# Patient Record
Sex: MALE | Race: OTHER | HISPANIC OR LATINO | URBAN - METROPOLITAN AREA
[De-identification: names, ages, dates, MRNs, and addresses within clinical notes are randomized per-mention and may not be internally consistent; named-entity substitution may affect disease eponyms.]

---

## 2018-02-19 ENCOUNTER — EMERGENCY (EMERGENCY)
Facility: HOSPITAL | Age: 48
LOS: 1 days | Discharge: HOME | End: 2018-02-19
Attending: EMERGENCY MEDICINE
Payer: MEDICAID

## 2018-02-19 VITALS
DIASTOLIC BLOOD PRESSURE: 87 MMHG | RESPIRATION RATE: 16 BRPM | SYSTOLIC BLOOD PRESSURE: 131 MMHG | OXYGEN SATURATION: 100 % | HEART RATE: 81 BPM

## 2018-02-19 VITALS
DIASTOLIC BLOOD PRESSURE: 89 MMHG | RESPIRATION RATE: 20 BRPM | TEMPERATURE: 99 F | OXYGEN SATURATION: 99 % | WEIGHT: 205.03 LBS | HEIGHT: 70 IN | HEART RATE: 80 BPM | SYSTOLIC BLOOD PRESSURE: 133 MMHG

## 2018-02-19 PROCEDURE — 71046 X-RAY EXAM CHEST 2 VIEWS: CPT | Mod: 26

## 2018-02-19 PROCEDURE — 99283 EMERGENCY DEPT VISIT LOW MDM: CPT | Mod: 25

## 2018-02-19 PROCEDURE — 71046 X-RAY EXAM CHEST 2 VIEWS: CPT

## 2018-02-19 PROCEDURE — 93005 ELECTROCARDIOGRAM TRACING: CPT

## 2018-02-19 PROCEDURE — 96372 THER/PROPH/DIAG INJ SC/IM: CPT

## 2018-02-19 PROCEDURE — 99284 EMERGENCY DEPT VISIT MOD MDM: CPT | Mod: 25

## 2018-02-19 PROCEDURE — 93010 ELECTROCARDIOGRAM REPORT: CPT

## 2018-02-19 RX ORDER — KETOROLAC TROMETHAMINE 30 MG/ML
15 SYRINGE (ML) INJECTION ONCE
Qty: 0 | Refills: 0 | Status: DISCONTINUED | OUTPATIENT
Start: 2018-02-19 | End: 2018-02-19

## 2018-02-19 RX ADMIN — Medication 15 MILLIGRAM(S): at 11:58

## 2018-02-19 NOTE — ED PROVIDER NOTE - CARE PLAN
Principal Discharge DX:	Cough Principal Discharge DX:	Cough  Assessment and plan of treatment:	You have been diagnosed with a viral cough. The medicine that we gave you here was like motrin, you can continue to take over the counter motrin and tylenol to help with your symptoms. Make sure that you continue to eat and drink plenty of fluids. If you have any new or concerning symptoms please come right back to the emergency room.

## 2018-02-19 NOTE — ED PROVIDER NOTE - PLAN OF CARE
You have been diagnosed with a viral cough. The medicine that we gave you here was like motrin, you can continue to take over the counter motrin and tylenol to help with your symptoms. Make sure that you continue to eat and drink plenty of fluids. If you have any new or concerning symptoms please come right back to the emergency room.

## 2018-02-19 NOTE — ED ADULT NURSE NOTE - OBJECTIVE STATEMENT
pt ambulatory to room 21 c/o cough body aches for 3 days. pt denies fever but c/o chills. denies sick contact.pt awake alert color good skin warm dry lungs clear with b/l breath sounds pulse ox100 c/o productive cough of green mucus.

## 2018-02-19 NOTE — ED PROVIDER NOTE - MEDICAL DECISION MAKING DETAILS
47 y M with no pmhx with cough and body aches. DDx: Flu, viral URI, bronchitis. Will get EKG for screening, IM toradol for body aches, CXR. If neg will likely DC home with PO hydration. 47 y M with no pmhx with cough and body aches. DDx: Flu, viral URI, bronchitis. Will get EKG for screening, IM toradol for body aches, CXR. If neg will likely DC home with PO hydration.  maged - healthy male with 3 days cough , uri , symptoms, malisise body aches likles flu cp w cough - cxr, ekg, if neg - dc w supportive care

## 2018-02-19 NOTE — ED PROVIDER NOTE - OBJECTIVE STATEMENT
pt 47 Y M with no pmhx non-smoker who presents with 3 days of mildly productive. He admits to body aches, sputum that is mildly green. Denies nausea, vomiting, diarrhea, fever, LOC. He states that he has some anterior chest pain only when coughing. He has no known cardiac hx and denies diaphoresis.

## 2019-03-10 PROBLEM — Z83.438 FAMILY HISTORY OF HYPERLIPIDEMIA: Status: ACTIVE | Noted: 2019-03-10

## 2019-03-10 PROBLEM — Z78.9 DOES NOT USE ILLICIT DRUGS: Status: ACTIVE | Noted: 2019-03-10

## 2019-03-10 PROBLEM — Z78.9 DOES NOT USE TOBACCO: Status: ACTIVE | Noted: 2019-03-10

## 2019-03-10 PROBLEM — Z86.39 HISTORY OF HYPERLIPIDEMIA: Status: RESOLVED | Noted: 2019-03-10 | Resolved: 2019-03-10

## 2019-03-11 ENCOUNTER — NON-APPOINTMENT (OUTPATIENT)
Age: 49
End: 2019-03-11

## 2019-03-11 ENCOUNTER — APPOINTMENT (OUTPATIENT)
Dept: CARDIOLOGY | Facility: CLINIC | Age: 49
End: 2019-03-11
Payer: MEDICAID

## 2019-03-11 VITALS
SYSTOLIC BLOOD PRESSURE: 142 MMHG | HEIGHT: 68 IN | WEIGHT: 200 LBS | OXYGEN SATURATION: 97 % | HEART RATE: 59 BPM | BODY MASS INDEX: 30.31 KG/M2 | DIASTOLIC BLOOD PRESSURE: 91 MMHG

## 2019-03-11 DIAGNOSIS — Z86.39 PERSONAL HISTORY OF OTHER ENDOCRINE, NUTRITIONAL AND METABOLIC DISEASE: ICD-10-CM

## 2019-03-11 DIAGNOSIS — Z83.438 FAMILY HISTORY OF OTHER DISORDER OF LIPOPROTEIN METABOLISM AND OTHER LIPIDEMIA: ICD-10-CM

## 2019-03-11 DIAGNOSIS — Z78.9 OTHER SPECIFIED HEALTH STATUS: ICD-10-CM

## 2019-03-11 DIAGNOSIS — R07.9 CHEST PAIN, UNSPECIFIED: ICD-10-CM

## 2019-03-11 PROCEDURE — 99205 OFFICE O/P NEW HI 60 MIN: CPT | Mod: 25

## 2019-03-11 PROCEDURE — 99402 PREV MED CNSL INDIV APPRX 30: CPT

## 2019-03-11 PROCEDURE — 93000 ELECTROCARDIOGRAM COMPLETE: CPT

## 2019-03-12 ENCOUNTER — APPOINTMENT (OUTPATIENT)
Dept: CARDIOLOGY | Facility: CLINIC | Age: 49
End: 2019-03-12
Payer: MEDICAID

## 2019-03-12 PROCEDURE — 93015 CV STRESS TEST SUPVJ I&R: CPT

## 2019-04-25 NOTE — ED ADULT NURSE REASSESSMENT NOTE - TEMPLATE LIST FOR HEAD TO TOE ASSESSMENT
Quality 226: Preventive Care And Screening: Tobacco Use: Screening And Cessation Intervention: Patient screened for tobacco use and is an ex/non-smoker Respiratory Detail Level: Detailed Quality 431: Preventive Care And Screening: Unhealthy Alcohol Use - Screening: Patient screened for unhealthy alcohol use using a single question and scores less than 2 times per year Quality 130: Documentation Of Current Medications In The Medical Record: Current Medications Documented

## 2023-11-09 ENCOUNTER — EMERGENCY (EMERGENCY)
Facility: HOSPITAL | Age: 53
LOS: 1 days | Discharge: HOME | End: 2023-11-09
Attending: EMERGENCY MEDICINE
Payer: MEDICAID

## 2023-11-09 VITALS
HEIGHT: 68 IN | SYSTOLIC BLOOD PRESSURE: 146 MMHG | RESPIRATION RATE: 16 BRPM | HEART RATE: 72 BPM | OXYGEN SATURATION: 97 % | DIASTOLIC BLOOD PRESSURE: 97 MMHG | TEMPERATURE: 98 F | WEIGHT: 214.95 LBS

## 2023-11-09 VITALS — RESPIRATION RATE: 15 BRPM | TEMPERATURE: 98 F | HEART RATE: 70 BPM | OXYGEN SATURATION: 95 %

## 2023-11-09 LAB
ALBUMIN SERPL ELPH-MCNC: 4.5 G/DL — SIGNIFICANT CHANGE UP (ref 3.3–5)
ALBUMIN SERPL ELPH-MCNC: 4.5 G/DL — SIGNIFICANT CHANGE UP (ref 3.3–5)
ALP SERPL-CCNC: 55 U/L — SIGNIFICANT CHANGE UP (ref 40–120)
ALP SERPL-CCNC: 55 U/L — SIGNIFICANT CHANGE UP (ref 40–120)
ALT FLD-CCNC: 25 U/L — SIGNIFICANT CHANGE UP (ref 10–45)
ALT FLD-CCNC: 25 U/L — SIGNIFICANT CHANGE UP (ref 10–45)
ANION GAP SERPL CALC-SCNC: 13 MMOL/L — SIGNIFICANT CHANGE UP (ref 5–17)
ANION GAP SERPL CALC-SCNC: 13 MMOL/L — SIGNIFICANT CHANGE UP (ref 5–17)
APTT BLD: 29.1 SEC — SIGNIFICANT CHANGE UP (ref 24.5–35.6)
APTT BLD: 29.1 SEC — SIGNIFICANT CHANGE UP (ref 24.5–35.6)
AST SERPL-CCNC: 23 U/L — SIGNIFICANT CHANGE UP (ref 10–40)
AST SERPL-CCNC: 23 U/L — SIGNIFICANT CHANGE UP (ref 10–40)
BASOPHILS # BLD AUTO: 0.08 K/UL — SIGNIFICANT CHANGE UP (ref 0–0.2)
BASOPHILS # BLD AUTO: 0.08 K/UL — SIGNIFICANT CHANGE UP (ref 0–0.2)
BASOPHILS NFR BLD AUTO: 1 % — SIGNIFICANT CHANGE UP (ref 0–2)
BASOPHILS NFR BLD AUTO: 1 % — SIGNIFICANT CHANGE UP (ref 0–2)
BILIRUB SERPL-MCNC: 0.4 MG/DL — SIGNIFICANT CHANGE UP (ref 0.2–1.2)
BILIRUB SERPL-MCNC: 0.4 MG/DL — SIGNIFICANT CHANGE UP (ref 0.2–1.2)
BUN SERPL-MCNC: 14 MG/DL — SIGNIFICANT CHANGE UP (ref 7–23)
BUN SERPL-MCNC: 14 MG/DL — SIGNIFICANT CHANGE UP (ref 7–23)
CALCIUM SERPL-MCNC: 9.8 MG/DL — SIGNIFICANT CHANGE UP (ref 8.4–10.5)
CALCIUM SERPL-MCNC: 9.8 MG/DL — SIGNIFICANT CHANGE UP (ref 8.4–10.5)
CHLORIDE SERPL-SCNC: 103 MMOL/L — SIGNIFICANT CHANGE UP (ref 96–108)
CHLORIDE SERPL-SCNC: 103 MMOL/L — SIGNIFICANT CHANGE UP (ref 96–108)
CO2 SERPL-SCNC: 24 MMOL/L — SIGNIFICANT CHANGE UP (ref 22–31)
CO2 SERPL-SCNC: 24 MMOL/L — SIGNIFICANT CHANGE UP (ref 22–31)
CREAT SERPL-MCNC: 1.26 MG/DL — SIGNIFICANT CHANGE UP (ref 0.5–1.3)
CREAT SERPL-MCNC: 1.26 MG/DL — SIGNIFICANT CHANGE UP (ref 0.5–1.3)
EGFR: 69 ML/MIN/1.73M2 — SIGNIFICANT CHANGE UP
EGFR: 69 ML/MIN/1.73M2 — SIGNIFICANT CHANGE UP
EOSINOPHIL # BLD AUTO: 0.19 K/UL — SIGNIFICANT CHANGE UP (ref 0–0.5)
EOSINOPHIL # BLD AUTO: 0.19 K/UL — SIGNIFICANT CHANGE UP (ref 0–0.5)
EOSINOPHIL NFR BLD AUTO: 2.5 % — SIGNIFICANT CHANGE UP (ref 0–6)
EOSINOPHIL NFR BLD AUTO: 2.5 % — SIGNIFICANT CHANGE UP (ref 0–6)
GLUCOSE SERPL-MCNC: 105 MG/DL — HIGH (ref 70–99)
GLUCOSE SERPL-MCNC: 105 MG/DL — HIGH (ref 70–99)
HCT VFR BLD CALC: 45.8 % — SIGNIFICANT CHANGE UP (ref 39–50)
HCT VFR BLD CALC: 45.8 % — SIGNIFICANT CHANGE UP (ref 39–50)
HGB BLD-MCNC: 15.5 G/DL — SIGNIFICANT CHANGE UP (ref 13–17)
HGB BLD-MCNC: 15.5 G/DL — SIGNIFICANT CHANGE UP (ref 13–17)
IMM GRANULOCYTES NFR BLD AUTO: 0.5 % — SIGNIFICANT CHANGE UP (ref 0–0.9)
IMM GRANULOCYTES NFR BLD AUTO: 0.5 % — SIGNIFICANT CHANGE UP (ref 0–0.9)
INR BLD: 1.05 RATIO — SIGNIFICANT CHANGE UP (ref 0.85–1.18)
INR BLD: 1.05 RATIO — SIGNIFICANT CHANGE UP (ref 0.85–1.18)
LIDOCAIN IGE QN: 27 U/L — SIGNIFICANT CHANGE UP (ref 7–60)
LIDOCAIN IGE QN: 27 U/L — SIGNIFICANT CHANGE UP (ref 7–60)
LYMPHOCYTES # BLD AUTO: 1.74 K/UL — SIGNIFICANT CHANGE UP (ref 1–3.3)
LYMPHOCYTES # BLD AUTO: 1.74 K/UL — SIGNIFICANT CHANGE UP (ref 1–3.3)
LYMPHOCYTES # BLD AUTO: 22.8 % — SIGNIFICANT CHANGE UP (ref 13–44)
LYMPHOCYTES # BLD AUTO: 22.8 % — SIGNIFICANT CHANGE UP (ref 13–44)
MCHC RBC-ENTMCNC: 30.6 PG — SIGNIFICANT CHANGE UP (ref 27–34)
MCHC RBC-ENTMCNC: 30.6 PG — SIGNIFICANT CHANGE UP (ref 27–34)
MCHC RBC-ENTMCNC: 33.8 GM/DL — SIGNIFICANT CHANGE UP (ref 32–36)
MCHC RBC-ENTMCNC: 33.8 GM/DL — SIGNIFICANT CHANGE UP (ref 32–36)
MCV RBC AUTO: 90.3 FL — SIGNIFICANT CHANGE UP (ref 80–100)
MCV RBC AUTO: 90.3 FL — SIGNIFICANT CHANGE UP (ref 80–100)
MONOCYTES # BLD AUTO: 0.42 K/UL — SIGNIFICANT CHANGE UP (ref 0–0.9)
MONOCYTES # BLD AUTO: 0.42 K/UL — SIGNIFICANT CHANGE UP (ref 0–0.9)
MONOCYTES NFR BLD AUTO: 5.5 % — SIGNIFICANT CHANGE UP (ref 2–14)
MONOCYTES NFR BLD AUTO: 5.5 % — SIGNIFICANT CHANGE UP (ref 2–14)
NEUTROPHILS # BLD AUTO: 5.17 K/UL — SIGNIFICANT CHANGE UP (ref 1.8–7.4)
NEUTROPHILS # BLD AUTO: 5.17 K/UL — SIGNIFICANT CHANGE UP (ref 1.8–7.4)
NEUTROPHILS NFR BLD AUTO: 67.7 % — SIGNIFICANT CHANGE UP (ref 43–77)
NEUTROPHILS NFR BLD AUTO: 67.7 % — SIGNIFICANT CHANGE UP (ref 43–77)
NRBC # BLD: 0 /100 WBCS — SIGNIFICANT CHANGE UP (ref 0–0)
NRBC # BLD: 0 /100 WBCS — SIGNIFICANT CHANGE UP (ref 0–0)
PLATELET # BLD AUTO: 274 K/UL — SIGNIFICANT CHANGE UP (ref 150–400)
PLATELET # BLD AUTO: 274 K/UL — SIGNIFICANT CHANGE UP (ref 150–400)
POTASSIUM SERPL-MCNC: 4 MMOL/L — SIGNIFICANT CHANGE UP (ref 3.5–5.3)
POTASSIUM SERPL-MCNC: 4 MMOL/L — SIGNIFICANT CHANGE UP (ref 3.5–5.3)
POTASSIUM SERPL-SCNC: 4 MMOL/L — SIGNIFICANT CHANGE UP (ref 3.5–5.3)
POTASSIUM SERPL-SCNC: 4 MMOL/L — SIGNIFICANT CHANGE UP (ref 3.5–5.3)
PROT SERPL-MCNC: 7.3 G/DL — SIGNIFICANT CHANGE UP (ref 6–8.3)
PROT SERPL-MCNC: 7.3 G/DL — SIGNIFICANT CHANGE UP (ref 6–8.3)
PROTHROM AB SERPL-ACNC: 11.5 SEC — SIGNIFICANT CHANGE UP (ref 9.5–13)
PROTHROM AB SERPL-ACNC: 11.5 SEC — SIGNIFICANT CHANGE UP (ref 9.5–13)
RBC # BLD: 5.07 M/UL — SIGNIFICANT CHANGE UP (ref 4.2–5.8)
RBC # BLD: 5.07 M/UL — SIGNIFICANT CHANGE UP (ref 4.2–5.8)
RBC # FLD: 13.2 % — SIGNIFICANT CHANGE UP (ref 10.3–14.5)
RBC # FLD: 13.2 % — SIGNIFICANT CHANGE UP (ref 10.3–14.5)
SODIUM SERPL-SCNC: 140 MMOL/L — SIGNIFICANT CHANGE UP (ref 135–145)
SODIUM SERPL-SCNC: 140 MMOL/L — SIGNIFICANT CHANGE UP (ref 135–145)
WBC # BLD: 7.64 K/UL — SIGNIFICANT CHANGE UP (ref 3.8–10.5)
WBC # BLD: 7.64 K/UL — SIGNIFICANT CHANGE UP (ref 3.8–10.5)
WBC # FLD AUTO: 7.64 K/UL — SIGNIFICANT CHANGE UP (ref 3.8–10.5)
WBC # FLD AUTO: 7.64 K/UL — SIGNIFICANT CHANGE UP (ref 3.8–10.5)

## 2023-11-09 PROCEDURE — 85730 THROMBOPLASTIN TIME PARTIAL: CPT

## 2023-11-09 PROCEDURE — 99285 EMERGENCY DEPT VISIT HI MDM: CPT

## 2023-11-09 PROCEDURE — 83690 ASSAY OF LIPASE: CPT

## 2023-11-09 PROCEDURE — 74177 CT ABD & PELVIS W/CONTRAST: CPT | Mod: MA

## 2023-11-09 PROCEDURE — 99284 EMERGENCY DEPT VISIT MOD MDM: CPT | Mod: 25

## 2023-11-09 PROCEDURE — 74177 CT ABD & PELVIS W/CONTRAST: CPT | Mod: 26,MA

## 2023-11-09 PROCEDURE — 85610 PROTHROMBIN TIME: CPT

## 2023-11-09 PROCEDURE — 80053 COMPREHEN METABOLIC PANEL: CPT

## 2023-11-09 PROCEDURE — 85025 COMPLETE CBC W/AUTO DIFF WBC: CPT

## 2023-11-09 NOTE — ED ADULT NURSE NOTE - OBJECTIVE STATEMENT
51yo M aaox4 h/o HTN, presents to ED from home, as per pt 15 days ago had an accidental lifting a heavy object , since 5 days ago started with bruising on the l lower abdominal area and pain, at this time is mild, Pt denies CP, SOB, HA, vision changes, n/v/d, fevers chills, weakness, URY symptoms Safety and comfort measures initiated- bed placed in lowest position and side rails raised.

## 2023-11-09 NOTE — ED PROVIDER NOTE - NSFOLLOWUPINSTRUCTIONS_ED_ALL_ED_FT
1) Follow-up with your primary care provider in 1-2 days.    2) Continue to take all medications as prescribed.    3) Rest and stay hydrated. Pain can be managed with Acetaminophen (aka Tylenol) over the counter as directed.    4) Return to the ER for any new or worsening symptoms, weakness, shortness of breath, palpitations, lightheadedness, weakness, new or worsening abdominal pain, blood in the urine or stool, fevers, chills, or if any other concerns.      Please read all attached patient information. 1) Follow-up with your primary care provider in 1-2 days.    Your CT today did not show any emergent findings, however it did show "1.0 cm right renal cortical exophytic dense lesion at the superior pole, too small to characterize." Radiology recommends nonemergent ultrasound for further characterization.    Please discuss these results with your PCP. A copy of your results is included with these discharge instructions.    2) Continue to take all medications as prescribed.    3) Rest and stay hydrated. Pain can be managed with Acetaminophen (aka Tylenol) over the counter as directed.    4) Return to the ER for any new or worsening symptoms, weakness, shortness of breath, palpitations, lightheadedness, weakness, new or worsening abdominal pain, blood in the urine or stool, fevers, chills, or if any other concerns.      Please read all attached patient information.

## 2023-11-09 NOTE — ED PROVIDER NOTE - PATIENT PORTAL LINK FT
You can access the FollowMyHealth Patient Portal offered by [facility] by registering at the following website: [email or web address]. By joining [employer]’s FollowMyHealth portal, you will also be able to view your health information using other applications (apps) compatible with our system.

## 2023-11-09 NOTE — ED ADULT NURSE NOTE - NSFALLUNIVINTERV_ED_ALL_ED
Detail Level: Detailed
Bed/Stretcher in lowest position, wheels locked, appropriate side rails in place/Call bell, personal items and telephone in reach/Instruct patient to call for assistance before getting out of bed/chair/stretcher/Non-slip footwear applied when patient is off stretcher/[place] to call system/Physically safe environment - no spills, clutter or unnecessary equipment/Purposeful proactive rounding/Room/bathroom lighting operational, light cord in reach

## 2023-11-09 NOTE — ED PROVIDER NOTE - CLINICAL SUMMARY MEDICAL DECISION MAKING FREE TEXT BOX
Is unclear the cause of the patient's abdominal wall discoloration.  Concern for RP hematoma.  Basic blood work and CT abdomen with IV contrast to evaluate for splenic injury RP bleed hernia or other cause of the patient's symptoms.  Patient does not want pain medication at this very moment. Is unclear the cause of the patient's abdominal wall discoloration.  Concern for RP hematoma.  Basic blood work and CT abdomen with IV contrast to evaluate for splenic injury RP bleed hernia or other cause of the patient's symptoms.  Patient does not want pain medication at this very moment. -Juan M Diggs MD-

## 2023-11-09 NOTE — ED PROVIDER NOTE - PROGRESS NOTE DETAILS
CT resulted no acute pathology, hemoglobin within normal limits, patient abdomen soft nontender, patient feeling well and vital signs stable in ED.  Patient ready and stable for discharge.  All results discussed with patient including incidental finding of right renal cortical lesion, recommended outpatient PCP follow-up for further imaging. -Aubrey Ayala PA-C

## 2023-11-09 NOTE — ED PROVIDER NOTE - OBJECTIVE STATEMENT
This is a 52-year-old fellow 2 weeks ago was lifting something hurt the left side of his back.  No saddle anesthesia no urinary incontinence.  He went to see urgent care and based prescribed him a muscle relaxant and naproxen and has developed a left-sided abdominal wall discoloration as well as when he moves a lot sometimes he gets severe intermittent pain in the left side.  No aspirin or blood thinners.  Never happened to him before.  He went to see his doctor, Dr. Jorge Luis Mcdaniels who sent him to the hospital.  The pain is not very severe but can occasionally does get severe.  That discoloration is the worrisome thing.  That is been going on for about a week. This is a 52-year-old fellow 2 weeks ago was lifting something hurt the left side of his back.  No saddle anesthesia no urinary incontinence.  He went to see urgent care and based prescribed him a muscle relaxant and naproxen and has developed a left-sided abdominal wall discoloration as well as when he moves a lot sometimes he gets severe intermittent pain in the left side.  No aspirin or blood thinners.  Never happened to him before.  He went to see his doctor, Dr. Jorge Luis Mcdaniels who sent him to the hospital.  The pain is not very severe but can occasionally does get severe.  That discoloration is the worrisome thing.  That is been going on for about a week.    52-year-old male PMH of HLD presents to the ED complaining of back and side pain after lifting a heavy object 15 days ago.  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  Several days ago patient reported development of ecchymosis in his left lower quadrant, has mild tenderness to this area, was seen by his PCP today who referred him to the emergency department.  Patient denies history of pancreatitis or other pancreatic disease, alcohol use, fevers, chills, other current abdominal pain, nausea, vomiting, diarrhea, change in bladder or bowel habits, urinary complaints, testicular pain, anticoagulant or antiplatelet use.  Patient reports feeling mild lightheadedness, denies shortness of breath, palpitations, weakness, fatigue.

## 2023-11-09 NOTE — ED ADULT NURSE NOTE - ED STAT RN HANDOFF DETAILS
Report given to receiving change of shift EVELYN CASTILLO patient is in no acute distress. Patient vital signs stable, plan of care explained.

## 2023-11-09 NOTE — ED PROVIDER NOTE - PHYSICAL EXAMINATION
Awake alert talking no acute distress.  Ambulation with normal gait.  Fluid speech.  Left abdominal wall with ecchymosis.  No midline spinal tenderness.  No paraspinal tenderness.  No CVA tenderness.  No obvious hernias.  Regular rate and rhythm.  Clear lungs. Awake alert talking no acute distress.  Ambulation with normal gait.  Fluid speech.  Left abdominal wall with ecchymosis.  No midline spinal tenderness.  No paraspinal tenderness.  No CVA tenderness.  No obvious hernias.  Regular rate and rhythm.  Clear lungs.    GEN: Pt in NAD, A&O x3.  PSYCH: Affect appropriate.  EYES: Sclera white w/o injection.   ENT: Head NCAT. MMM. Neck supple FROM.  RESP: CTA b/l, no wheezes, rales, or rhonchi.   CARDIAC: RRR, clear distinct S1, S2, no appreciable murmurs.  ABD: Abdomen soft, mild LLQ ttp. No CVAT b/l.  VASC: 2+ radial and dorsalis pedis pulses b/l. No edema or calf tenderness.  MSK: Mild pain with resisted left lateral rotation of trunk.  SKIN: Ecchymosis LLQ extending toward L flank.

## 2023-11-09 NOTE — ED ADULT TRIAGE NOTE - CHIEF COMPLAINT QUOTE
lower back pain x 2 weeks. L side of abd hematoma. pt reports lifting something 500lb prior to pain and bruising.

## 2024-02-23 NOTE — ED PROVIDER NOTE - NS ED NOTE AC HIGH RISK COUNTRIES
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  Consulted Interventional Radiology for a biopsy on 02/23/2024 to determine a definitive diagnosis and possible treatment options    CTA of the chest/PE protocol/CT scan of the abdomen and pelvis (02/21/2024):  IMPRESSION:     No evidence for acute pulmonary embolus. No acute thoracic abnormality identified.     Increasing elevation of the diaphragm secondary to large abdominal tumors process.     There is extensive multi nodular solid enhancing mass lesions as well as large hypertrophic fatty components. This extends throughout the retroperitoneum bilaterally, with significant mass effect on the liver, kidneys and associated vasculature. There is also significant compression and distortion of the stomach and duodenal sweep related to large masses along the margin of the stomach as well as appearing intraluminal within the duodenum. Multiple solid enhancing masses within the retroperitoneum which are increased compared to the prior. 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     Mild right sided hydronephrosis likely related to distortion and mass effect described above.     Several midline abdominal wall ventral hernias containing nonobstructed loops of bowel. No findings to suggest acute bowel obstruction despite the extensive distortion and intraluminal masses noted above.       No

## 2024-05-06 NOTE — ED ADULT NURSE NOTE - NSFALLLASTSIX_ED_ALL_ED
Blood pressure is very well-controlled.  Continue current regimen of furosemide daily which has helped with his lower extremity swelling.  He is monitoring home blood pressure readings at home.  If he is starting to run above 140/90 with any consistency, he should reach out to the office.  Otherwise, I will see him back in 6 months.   No.

## 2024-10-29 ENCOUNTER — EMERGENCY (EMERGENCY)
Facility: HOSPITAL | Age: 54
LOS: 1 days | Discharge: HOME | End: 2024-10-29
Attending: STUDENT IN AN ORGANIZED HEALTH CARE EDUCATION/TRAINING PROGRAM
Payer: MEDICAID

## 2024-10-29 VITALS
TEMPERATURE: 98 F | RESPIRATION RATE: 19 BRPM | SYSTOLIC BLOOD PRESSURE: 131 MMHG | OXYGEN SATURATION: 97 % | HEART RATE: 58 BPM | DIASTOLIC BLOOD PRESSURE: 82 MMHG

## 2024-10-29 VITALS
HEIGHT: 68 IN | DIASTOLIC BLOOD PRESSURE: 74 MMHG | WEIGHT: 205.03 LBS | HEART RATE: 72 BPM | SYSTOLIC BLOOD PRESSURE: 119 MMHG | OXYGEN SATURATION: 96 % | RESPIRATION RATE: 20 BRPM | TEMPERATURE: 98 F

## 2024-10-29 LAB
ALBUMIN SERPL ELPH-MCNC: 4.7 G/DL — SIGNIFICANT CHANGE UP (ref 3.3–5)
ALP SERPL-CCNC: 48 U/L — SIGNIFICANT CHANGE UP (ref 40–120)
ALT FLD-CCNC: 25 U/L — SIGNIFICANT CHANGE UP (ref 10–45)
ANION GAP SERPL CALC-SCNC: 14 MMOL/L — SIGNIFICANT CHANGE UP (ref 5–17)
APPEARANCE UR: ABNORMAL
AST SERPL-CCNC: 38 U/L — SIGNIFICANT CHANGE UP (ref 10–40)
BACTERIA # UR AUTO: NEGATIVE /HPF — SIGNIFICANT CHANGE UP
BASOPHILS # BLD AUTO: 0.07 K/UL — SIGNIFICANT CHANGE UP (ref 0–0.2)
BASOPHILS NFR BLD AUTO: 1 % — SIGNIFICANT CHANGE UP (ref 0–2)
BILIRUB SERPL-MCNC: 0.4 MG/DL — SIGNIFICANT CHANGE UP (ref 0.2–1.2)
BILIRUB UR-MCNC: NEGATIVE — SIGNIFICANT CHANGE UP
BUN SERPL-MCNC: 16 MG/DL — SIGNIFICANT CHANGE UP (ref 7–23)
CALCIUM SERPL-MCNC: 9.9 MG/DL — SIGNIFICANT CHANGE UP (ref 8.4–10.5)
CAST: 0 /LPF — SIGNIFICANT CHANGE UP (ref 0–4)
CHLORIDE SERPL-SCNC: 99 MMOL/L — SIGNIFICANT CHANGE UP (ref 96–108)
CO2 SERPL-SCNC: 26 MMOL/L — SIGNIFICANT CHANGE UP (ref 22–31)
COLOR SPEC: YELLOW — SIGNIFICANT CHANGE UP
CREAT SERPL-MCNC: 1.21 MG/DL — SIGNIFICANT CHANGE UP (ref 0.5–1.3)
DIFF PNL FLD: NEGATIVE — SIGNIFICANT CHANGE UP
EGFR: 72 ML/MIN/1.73M2 — SIGNIFICANT CHANGE UP
EOSINOPHIL # BLD AUTO: 0.12 K/UL — SIGNIFICANT CHANGE UP (ref 0–0.5)
EOSINOPHIL NFR BLD AUTO: 1.8 % — SIGNIFICANT CHANGE UP (ref 0–6)
GLUCOSE SERPL-MCNC: 94 MG/DL — SIGNIFICANT CHANGE UP (ref 70–99)
GLUCOSE UR QL: NEGATIVE MG/DL — SIGNIFICANT CHANGE UP
HCT VFR BLD CALC: 45 % — SIGNIFICANT CHANGE UP (ref 39–50)
HGB BLD-MCNC: 15.1 G/DL — SIGNIFICANT CHANGE UP (ref 13–17)
IMM GRANULOCYTES NFR BLD AUTO: 0.3 % — SIGNIFICANT CHANGE UP (ref 0–0.9)
KETONES UR-MCNC: NEGATIVE MG/DL — SIGNIFICANT CHANGE UP
LEUKOCYTE ESTERASE UR-ACNC: NEGATIVE — SIGNIFICANT CHANGE UP
LIDOCAIN IGE QN: 33 U/L — SIGNIFICANT CHANGE UP (ref 7–60)
LYMPHOCYTES # BLD AUTO: 1.69 K/UL — SIGNIFICANT CHANGE UP (ref 1–3.3)
LYMPHOCYTES # BLD AUTO: 25.3 % — SIGNIFICANT CHANGE UP (ref 13–44)
MCHC RBC-ENTMCNC: 30.2 PG — SIGNIFICANT CHANGE UP (ref 27–34)
MCHC RBC-ENTMCNC: 33.6 G/DL — SIGNIFICANT CHANGE UP (ref 32–36)
MCV RBC AUTO: 90 FL — SIGNIFICANT CHANGE UP (ref 80–100)
MONOCYTES # BLD AUTO: 0.55 K/UL — SIGNIFICANT CHANGE UP (ref 0–0.9)
MONOCYTES NFR BLD AUTO: 8.2 % — SIGNIFICANT CHANGE UP (ref 2–14)
NEUTROPHILS # BLD AUTO: 4.22 K/UL — SIGNIFICANT CHANGE UP (ref 1.8–7.4)
NEUTROPHILS NFR BLD AUTO: 63.4 % — SIGNIFICANT CHANGE UP (ref 43–77)
NITRITE UR-MCNC: NEGATIVE — SIGNIFICANT CHANGE UP
NRBC # BLD: 0 /100 WBCS — SIGNIFICANT CHANGE UP (ref 0–0)
PH UR: 8.5 (ref 5–8)
PLATELET # BLD AUTO: 301 K/UL — SIGNIFICANT CHANGE UP (ref 150–400)
POTASSIUM SERPL-MCNC: 5.2 MMOL/L — SIGNIFICANT CHANGE UP (ref 3.5–5.3)
POTASSIUM SERPL-SCNC: 5.2 MMOL/L — SIGNIFICANT CHANGE UP (ref 3.5–5.3)
PROT SERPL-MCNC: 7.9 G/DL — SIGNIFICANT CHANGE UP (ref 6–8.3)
PROT UR-MCNC: NEGATIVE MG/DL — SIGNIFICANT CHANGE UP
RBC # BLD: 5 M/UL — SIGNIFICANT CHANGE UP (ref 4.2–5.8)
RBC # FLD: 13.6 % — SIGNIFICANT CHANGE UP (ref 10.3–14.5)
RBC CASTS # UR COMP ASSIST: 2 /HPF — SIGNIFICANT CHANGE UP (ref 0–4)
SODIUM SERPL-SCNC: 139 MMOL/L — SIGNIFICANT CHANGE UP (ref 135–145)
SP GR SPEC: 1.02 — SIGNIFICANT CHANGE UP (ref 1–1.03)
SQUAMOUS # UR AUTO: 0 /HPF — SIGNIFICANT CHANGE UP (ref 0–5)
UROBILINOGEN FLD QL: 1 MG/DL — SIGNIFICANT CHANGE UP (ref 0.2–1)
WBC # BLD: 6.67 K/UL — SIGNIFICANT CHANGE UP (ref 3.8–10.5)
WBC # FLD AUTO: 6.67 K/UL — SIGNIFICANT CHANGE UP (ref 3.8–10.5)
WBC UR QL: 0 /HPF — SIGNIFICANT CHANGE UP (ref 0–5)

## 2024-10-29 PROCEDURE — 74177 CT ABD & PELVIS W/CONTRAST: CPT | Mod: 26,MC

## 2024-10-29 PROCEDURE — 80053 COMPREHEN METABOLIC PANEL: CPT

## 2024-10-29 PROCEDURE — 99285 EMERGENCY DEPT VISIT HI MDM: CPT

## 2024-10-29 PROCEDURE — 85025 COMPLETE CBC W/AUTO DIFF WBC: CPT

## 2024-10-29 PROCEDURE — 99285 EMERGENCY DEPT VISIT HI MDM: CPT | Mod: 25

## 2024-10-29 PROCEDURE — 83690 ASSAY OF LIPASE: CPT

## 2024-10-29 PROCEDURE — 74177 CT ABD & PELVIS W/CONTRAST: CPT | Mod: MC

## 2024-10-29 PROCEDURE — 81001 URINALYSIS AUTO W/SCOPE: CPT

## 2024-10-29 PROCEDURE — 99284 EMERGENCY DEPT VISIT MOD MDM: CPT | Mod: 25

## 2024-10-29 NOTE — ED ADULT NURSE NOTE - OBJECTIVE STATEMENT
Pt is a 52 y/o male PMH HLD presents to the ED c/o abdominal pain. Pt AxO x3, states the abdominal pain started 1 day ago and has worsened today. Pt endorses the pain on the RLQ and worsens after eating. Pt states yesterday, the pain started in his inguinal area and radiated to RLQ. Pt endorses bloating, states his last BM was yesterday. Upon assessment, pain is worsened on palpation of RLQ and minimal pain when LLQ is palpated. Pt denies chest pain, SOB, urinary symptoms, N/V/D, HA or blurry vision.

## 2024-10-29 NOTE — ED ADULT NURSE NOTE - NSFALLUNIVINTERV_ED_ALL_ED
Bed/Stretcher in lowest position, wheels locked, appropriate side rails in place/Call bell, personal items and telephone in reach/Instruct patient to call for assistance before getting out of bed/chair/stretcher/Non-slip footwear applied when patient is off stretcher/[place] to call system/Physically safe environment - no spills, clutter or unnecessary equipment/Purposeful proactive rounding/Room/bathroom lighting operational, light cord in reach

## 2024-10-29 NOTE — ED PROCEDURE NOTE - PROCEDURE ADDITIONAL DETAILS
Emergency Department Focused Ultrasound performed at patient's bedside for educational purposes. The study was performed by a credentialed ultrasound provider. -Aubrey Ayala PA-C

## 2024-10-29 NOTE — ED PROVIDER NOTE - CLINICAL SUMMARY MEDICAL DECISION MAKING FREE TEXT BOX
Patient is a 53-year-old male history of hyperlipidemia presenting with 1 day history of right lower quadrant pain described as sharp stabbing pain.  Denies any nausea, vomiting, fever, chills, CP, SOB, diarrhea, constipation, urinary symptoms.  Was seen by PMD 3 weeks ago for presumed divertic and put on Cipro, that pain has resolved.  Denies any history of abdominal surgeries.  Exam as above.  Will get labs, CT abdomen pelvis and reassess.

## 2024-10-29 NOTE — ED ADULT TRIAGE NOTE - AVIAN FLU SYMPTOMS
Treatment Goal Explanation (Does Not Render In The Note): Stable for the purposes of categorizing medical decision making is defined by the specific treatment goals for an individual patient. A patient that is not at their treatment goal is not stable, even if the condition has not changed and there is no short- term threat to life or function. Treatment Goal Met?: no No

## 2024-10-29 NOTE — ED PROVIDER NOTE - ATTENDING CONTRIBUTION TO CARE
52 yo M with PMHx of hyperlipidemia presents with RLQ pain for one day with no associated symptoms     PE: well appearing, nontoxic, no respiratory distress.  Neuro nonfocal.  Skin intact. Psych normal mood.    MDM: Differential diagnosis includes but is not limited to gastritis, cholelithiasis, cholecystitis, small bowel obstruction, diverticulitis, colitis, pancreatitis, appendicitis, UTI  Will assess with labs and CTAP     Refer to progress notes for continued care

## 2024-10-29 NOTE — ED PROCEDURE NOTE - ATTENDING APP SHARED VISIT CONTRIBUTION OF CARE
I was present for key portions of the procedure and readily available in the department for additional assistance if indicated.

## 2024-10-29 NOTE — ED PROVIDER NOTE - RAPID ASSESSMENT
52 y/o male PMHx HLD now presenting to the ED with RLQ pain x 1 day. Patient described to be sharp stabbing well localized without nausea or vomiting. Denied fever . Of note, Patient seen by PMD 3 weeks ago for LLQ pain prescribed cipro 500mg x2 weeks for presumed divertic and was recommended CT AP but unable to get 2/2 insurance issues.     JAY Romero: Patient seen by myself in triage area for rapid assessment only. Full H&P to be performed in main emergency room by ER providers.

## 2024-10-29 NOTE — ED PROVIDER NOTE - NS ED ROS FT
GENERAL: No fever or chills  EYES: No change in vision  HEENT: No trouble swallowing or speaking  CARDIAC: No chest pain  PULMONARY: No cough or SOB  GI: +abdominal pain. no nausea or no vomiting, no diarrhea or constipation  : No changes in urination  SKIN: No rashes  NEURO: No headache, no numbness  MSK: No joint pain  Otherwise as HPI or negative.

## 2024-10-29 NOTE — ED PROVIDER NOTE - NSFOLLOWUPINSTRUCTIONS_ED_ALL_ED_FT
You were seen and evaluated in the Emergency Department for your abdominal pain. You were evaluated clinically and with laboratory studies.    At this time your clinical evaluation and history do not demonstrate any acute, life-threatening medical conditions warranting emergent treatment. However, we strongly recommend you follow up with one of our GI consultants (or your own) for further evaluation of your symptoms by calling the following number to make an appointment:    [facility]  Gastroenterology  [address] [place] [address], [address]  [place] [address]  Phone: [phone]    Should you develop new or worsening abdominal pain, fevers, chills, nausea, vomiting, diarrhea, or constipation - please return to the ED for immediate evaluation.     We also strongly encourage you make an appointment with your Primary Care Physician for a comprehensive evaluation of your health.

## 2024-10-29 NOTE — ED PROVIDER NOTE - PHYSICAL EXAMINATION
General: alert, oriented to person, time, place  Psych: mood appropriate  Head: normocephalic; atraumatic  Eyes: EOMI, conjunctivae clear bilaterally, sclerae anicteric  ENT: no nasal flaring, patent nares  Cardio: RRR, no m/r/g, pulses 2+ b/l  Resp: CATB, no w/r/r  GI: tenderness to palpation RLQ, negative rovsing, rebound, obturator. Abd soft, nondistended.   : no CVA tenderness  Neuro: normal sensation, moving all four extremities equally  Skin: No evidence of rash or bruising  MSK: normal movement of all extremities  Lymph/Vasc: no LE edema

## 2024-10-29 NOTE — ED ADULT NURSE REASSESSMENT NOTE - NS ED NURSE REASSESS COMMENT FT1
Report received from SHIKHA Ann. Pt received A&Ox4, vitals retaken and documented. Bed locked and in lowest position, side rails raised, call bell within reach. Currently pending CTr. Ultrasound at bedside

## 2024-10-29 NOTE — ED PROVIDER NOTE - PATIENT PORTAL LINK FT
You can access the FollowMyHealth Patient Portal offered by [facility] by registering at the following website: [email or web address]. By joining [employer]’s FollowMyHealth portal, you will also be able to view your health information using other applications (apps) compatible with our system.